# Patient Record
Sex: FEMALE | Race: NATIVE HAWAIIAN OR OTHER PACIFIC ISLANDER
[De-identification: names, ages, dates, MRNs, and addresses within clinical notes are randomized per-mention and may not be internally consistent; named-entity substitution may affect disease eponyms.]

---

## 2018-12-03 ENCOUNTER — HOSPITAL ENCOUNTER (EMERGENCY)
Dept: HOSPITAL 14 - H.ER | Age: 43
LOS: 1 days | Discharge: HOME | End: 2018-12-04
Payer: COMMERCIAL

## 2018-12-03 DIAGNOSIS — F20.9: ICD-10-CM

## 2018-12-03 DIAGNOSIS — F39: Primary | ICD-10-CM

## 2018-12-03 DIAGNOSIS — F31.9: ICD-10-CM

## 2018-12-03 NOTE — ED PDOC
HPI: Psych/Substance Abuse


Time Seen by Provider: 18 20:54


Chief Complaint (Nursing): Psychiatric Evaluation


Chief Complaint (Provider): crisis eval


History Per: Patient, EMS


Additional Complaint(s): 





44 y/o female brought in by EMS for crisis evaluation. Patient states she called

911 because there was a worker in her building banging on her door trying to get

in.  As per EMS, patient called 911 acting paranoid and could not provide EMS or

police with clear or coherent thoughts; mobile crisis was contacted but unable 

to get to home.  Patient calm at present, states she is feeling better; denies 

suicidal/homicidal ideations, acute physical complaints.   





Past Medical History


Reviewed: Historical Data, Nursing Documentation, Vital Signs


Vital Signs: 





                                Last Vital Signs











Temp  98.4 F   18 20:15


 


Pulse  109 H  18 20:15


 


Resp  18   18 20:15


 


BP  156/104 H  18 20:15


 


Pulse Ox  100   18 20:15














- Medical History


PMH: Bipolar Disorder, Schizophrenia





- Surgical History


Surgical History: 





- Family History


Family History: States: No Known Family Hx





- Allergies


Allergies/Adverse Reactions: 


                                    Allergies











Allergy/AdvReac Type Severity Reaction Status Date / Time


 


No Known Allergies Allergy   Verified 18 21:29














Review of Systems


ROS Statement: Except As Marked, All Systems Reviewed And Found Negative


Psych: Positive for: Other (paranoia)





Physical Exam





- Reviewed


Nursing Documentation Reviewed: Yes


Vital Signs Reviewed: Yes





- Physical Exam


Appears: Positive for: Well, Non-toxic, No Acute Distress


Head Exam: Positive for: ATRAUMATIC, NORMAL INSPECTION, NORMOCEPHALIC


Skin: Positive for: Normal Color


Eye Exam: Positive for: Normal appearance


ENT: Positive for: Normal ENT Inspection


Cardiovascular/Chest: Positive for: Regular Rate, Rhythm


Respiratory: Positive for: Normal Breath Sounds


Gastrointestinal/Abdominal: Positive for: Normal Exam


Back: Positive for: Normal Inspection


Extremity: Positive for: Normal ROM


Neurologic/Psych: Positive for: Alert, Oriented (x3), Mood/Affect (guarded)





- ECG


O2 Sat by Pulse Oximetry: 100





- Progress


ED Course And Treament: 





Patient evaluated by crisis worker; does not meet criteria for admission at this

time as per Dr. Sauer


Patient require no further intervention in the ED and is stable for discharge at

this time


Return precautions given














Disposition





- Clinical Impression


Clinical Impression: 


 Mood disorder








- Patient ED Disposition


Is Patient to be Admitted: No


Counseled Patient/Family Regarding: Diagnosis, Need For Followup





- Disposition


Disposition: Routine/Home


Disposition Time: 00:26


Condition: IMPROVED

## 2018-12-04 VITALS
OXYGEN SATURATION: 99 % | RESPIRATION RATE: 16 BRPM | HEART RATE: 86 BPM | SYSTOLIC BLOOD PRESSURE: 136 MMHG | DIASTOLIC BLOOD PRESSURE: 79 MMHG | TEMPERATURE: 98.1 F

## 2019-04-11 ENCOUNTER — HOSPITAL ENCOUNTER (EMERGENCY)
Dept: HOSPITAL 14 - H.ER | Age: 44
LOS: 1 days | Discharge: HOME | End: 2019-04-12
Payer: COMMERCIAL

## 2019-04-11 DIAGNOSIS — F31.9: ICD-10-CM

## 2019-04-11 DIAGNOSIS — F20.0: Primary | ICD-10-CM

## 2019-04-11 PROCEDURE — 96372 THER/PROPH/DIAG INJ SC/IM: CPT

## 2019-04-11 PROCEDURE — 93005 ELECTROCARDIOGRAM TRACING: CPT

## 2019-04-11 PROCEDURE — 99285 EMERGENCY DEPT VISIT HI MDM: CPT

## 2019-04-11 PROCEDURE — 85025 COMPLETE CBC W/AUTO DIFF WBC: CPT

## 2019-04-11 PROCEDURE — 71045 X-RAY EXAM CHEST 1 VIEW: CPT

## 2019-04-11 PROCEDURE — 81003 URINALYSIS AUTO W/O SCOPE: CPT

## 2019-04-11 PROCEDURE — 80048 BASIC METABOLIC PNL TOTAL CA: CPT

## 2019-04-11 PROCEDURE — 81025 URINE PREGNANCY TEST: CPT

## 2019-04-12 VITALS — OXYGEN SATURATION: 99 %

## 2019-04-12 VITALS — DIASTOLIC BLOOD PRESSURE: 77 MMHG | HEART RATE: 99 BPM | SYSTOLIC BLOOD PRESSURE: 118 MMHG | TEMPERATURE: 98.6 F

## 2019-04-12 VITALS — RESPIRATION RATE: 18 BRPM

## 2019-04-12 LAB
APAP SERPL-MCNC: < 10 UG/ML (ref 10–30)
BACTERIA #/AREA URNS HPF: (no result) /[HPF]
BASOPHILS # BLD AUTO: 0.1 K/UL (ref 0–0.2)
BASOPHILS NFR BLD: 0.6 % (ref 0–2)
BILIRUB UR-MCNC: NEGATIVE MG/DL
BUN SERPL-MCNC: 7 MG/DL (ref 7–17)
CALCIUM SERPL-MCNC: 9.7 MG/DL (ref 8.4–10.2)
COLOR UR: YELLOW
EOSINOPHIL # BLD AUTO: 0 K/UL (ref 0–0.7)
EOSINOPHIL NFR BLD: 0.2 % (ref 0–4)
ERYTHROCYTE [DISTWIDTH] IN BLOOD BY AUTOMATED COUNT: 12.6 % (ref 11.5–14.5)
GFR NON-AFRICAN AMERICAN: > 60
GLUCOSE UR STRIP-MCNC: (no result) MG/DL
HGB BLD-MCNC: 13.6 G/DL (ref 12–16)
LEUKOCYTE ESTERASE UR-ACNC: (no result) LEU/UL
LYMPHOCYTES # BLD AUTO: 2.6 K/UL (ref 1–4.3)
LYMPHOCYTES NFR BLD AUTO: 18.5 % (ref 20–40)
MCH RBC QN AUTO: 32.4 PG (ref 27–31)
MCHC RBC AUTO-ENTMCNC: 34.4 G/DL (ref 33–37)
MCV RBC AUTO: 94.3 FL (ref 81–99)
MONOCYTES # BLD: 0.7 K/UL (ref 0–0.8)
MONOCYTES NFR BLD: 5 % (ref 0–10)
NEUTROPHILS # BLD: 10.8 K/UL (ref 1.8–7)
NEUTROPHILS NFR BLD AUTO: 75.7 % (ref 50–75)
NRBC BLD AUTO-RTO: 0.1 % (ref 0–0)
PH UR STRIP: 6 [PH] (ref 5–8)
PLATELET # BLD: 396 K/UL (ref 130–400)
PMV BLD AUTO: 6.9 FL (ref 7.2–11.7)
PROT UR STRIP-MCNC: NEGATIVE MG/DL
RBC # BLD AUTO: 4.19 MIL/UL (ref 3.8–5.2)
RBC # UR STRIP: NEGATIVE /UL
SALICYLATES SERPL-MCNC: < 1 MG/DL
SP GR UR STRIP: 1.01 (ref 1–1.03)
SQUAMOUS EPITHIAL: 2 /HPF (ref 0–5)
URINE CLARITY: (no result)
UROBILINOGEN UR-MCNC: (no result) MG/DL (ref 0.2–1)
WBC # BLD AUTO: 14.3 K/UL (ref 4.8–10.8)

## 2019-04-12 NOTE — CP.PCM.CON
History of Present Illness





- History of Present Illness


History of Present Illness: 


Psychiatry consult note





CC: Bizarre behavior





HPI: Patient currently refusing to talk to writer, just states that she needs to

contact her .   Below history was obtained from the initial ER evaluation:







45 y/o  female who was brought into ED by EMS and [place] PD due to bizarre

behavior.  As per EMS, pt was sitting in a bar calling her friend and saying 

bizarre things.  Pts friend then called 911 for a welfare check.  As per the 

triage note, Pt seemed paranoid and was not forthcoming with information.  While

in ED, Pt kept calling attorneys and was telling them that "under supreme court 

article she is not to be medicated or admitted to any hospital and that she is 

being held against her will in the hospital.  Pt stated her friend Efren who 

lives in [place] called 911 on her and stated the police wanted her to come to the 

hospital for a voluntary welfare check.  Pt denied having a psych hx and denied 

any psych tx.  Pt denied being on medications.  Pt stated she is living in [place] 

and is not a resident of [place].  CW seen a cable box in pts bag and asked pt about

this and she replied, Im moving.  Pt stated she lives in a 3 bedroom home in 

[place] and reported living alone.  Pt denied s/h ideations, as well as, a/v h

allucinations.  Pt denied ever attempting suicide.  Pt denied current/past 

abuse.  Pt denied having any criminal hx.  Pt denied issues with sleep/appetite.

 Pt stated she has a restraining order on her mom and did have any contact with 

her.  Pt stated she speaks to her dad but stated CW cannot contact him and 

stated she would not give CW his number.  Pts speech was loud and affect was 

flat.  Pt seemed bizarre and was fixated on calling her attorneys and public 

defender.  Pt made 3 calls to her  and  and left 

voicemails during the assessment.  Pt was not being cooperative and had to be 

restrained and medicated.  Pt kept repeating under Supreme Court article she is

not to be medicated or admitted to any hospital.  Pt stated she did not want or

need psych admission.





CW spoke to pts sister in law, Nidhi [phone], who stated pt has an hx 

of paranoid schizophrenia and was dx 8 years ago by [facility].  She stated pt is not 

compliant medications or psych tx.  She stated pts family lives in [place] and 

stated pt and her siblings are adopted.  She stated pts bio-mom was dx with an 

unknown mental illness and currently lives in [facility].  She stated pt has been 

delusional and paranoid.  She stated pt is getting a divorce and has a 6 y/o 

son.  She stated her  is supporting pt and stated pt lives in [place] and

does not know her address.  However, she reported that pts  and child 

has a restraining order on pt and pt violated it recently by going to [employer] 

school.  She stated pt could be possibly be danger to herself and others due to 

not complying mental health tx.  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

be a teacher.  She reported pt being admitted in [place] and [place] in psych unit and 

most recent was a month ago in [place].  She feels pt needs psych admission to be 

stabilized. 





Impression: 45 yo female w h/o schizophrenia, should be screened for involuntary

psychiatric admission.





Past Patient History





- Past Social History


Smoking Status: Unknown If Ever Smoked





- CARDIAC


Hx Hypertension: No





- PULMONARY


Hx Tuberculosis: No





- NEUROLOGICAL


Hx Seizures: No





- HEMATOLOGICAL/ONCOLOGICAL


Hx Human Immunodeficiency Virus (HIV): No





- GENITOURINARY/GYNECOLOGICAL


Hx Sexually Transmitted Disorders: No





- PSYCHIATRIC


Hx Bipolar Disorder: Yes


Hx Schizophrenia: Yes





- SURGICAL HISTORY


Hx Surgeries: No





Meds


Allergies/Adverse Reactions: 


                                    Allergies











Allergy/AdvReac Type Severity Reaction Status Date / Time


 


No Known Allergies Allergy   Verified 12/03/18 21:29














Results





- Vital Signs


Recent Vital Signs: 


                                Last Vital Signs











Temp  98.0 F   04/12/19 05:29


 


Pulse  71   04/12/19 05:29


 


Resp  15   04/12/19 05:29


 


BP  100/61   04/12/19 05:29


 


Pulse Ox  99   04/12/19 11:55














- Labs


Result Diagrams: 


                                 04/12/19 02:33





                                 04/12/19 02:33


Labs: 


                         Laboratory Results - last 24 hr











  04/12/19 04/12/19 04/12/19





  02:33 02:33 02:33


 


WBC    14.3 H


 


RBC    4.19


 


Hgb    13.6


 


Hct    39.5


 


MCV    94.3


 


MCH    32.4 H


 


MCHC    34.4


 


RDW    12.6


 


Plt Count    396


 


MPV    6.9 L


 


Neut % (Auto)    75.7 H


 


Lymph % (Auto)    18.5 L


 


Mono % (Auto)    5.0


 


Eos % (Auto)    0.2


 


Baso % (Auto)    0.6


 


Neut # (Auto)    10.8 H


 


Lymph # (Auto)    2.6


 


Mono # (Auto)    0.7


 


Eos # (Auto)    0.0


 


Baso # (Auto)    0.1


 


Sodium   137 


 


Potassium   3.3 L 


 


Chloride   102 


 


Carbon Dioxide   23 


 


Anion Gap   15 


 


BUN   7 


 


Creatinine   0.5 L 


 


Est GFR ( Amer)   > 60 


 


Est GFR (Non-Af Amer)   > 60 


 


Random Glucose   119 H 


 


Calcium   9.7 


 


Urine Color   


 


Urine Clarity   


 


Urine pH   


 


Ur Specific Gravity   


 


Urine Protein   


 


Urine Glucose (UA)   


 


Urine Ketones   


 


Urine Blood   


 


Urine Nitrate   


 


Urine Bilirubin   


 


Urine Urobilinogen   


 


Ur Leukocyte Esterase   


 


Urine RBC (Auto)   


 


Urine Microscopic WBC   


 


Ur Squamous Epith Cells   


 


Urine Bacteria   


 


Salicylates  < 1.0  


 


Urine Opiates Screen   


 


Urine Methadone Screen   


 


Acetaminophen  < 10.0 L  


 


Ur Barbiturates Screen   


 


Ur Phencyclidine Scrn   


 


Ur Amphetamines Screen   


 


U Benzodiazepines Scrn   


 


U Oth Cocaine Metabols   


 


U Cannabinoids Screen   


 


Alcohol, Quantitative   < 10 














  04/12/19 04/12/19





  09:36 09:36


 


WBC  


 


RBC  


 


Hgb  


 


Hct  


 


MCV  


 


MCH  


 


MCHC  


 


RDW  


 


Plt Count  


 


MPV  


 


Neut % (Auto)  


 


Lymph % (Auto)  


 


Mono % (Auto)  


 


Eos % (Auto)  


 


Baso % (Auto)  


 


Neut # (Auto)  


 


Lymph # (Auto)  


 


Mono # (Auto)  


 


Eos # (Auto)  


 


Baso # (Auto)  


 


Sodium  


 


Potassium  


 


Chloride  


 


Carbon Dioxide  


 


Anion Gap  


 


BUN  


 


Creatinine  


 


Est GFR ( Amer)  


 


Est GFR (Non-Af Amer)  


 


Random Glucose  


 


Calcium  


 


Urine Color   Yellow


 


Urine Clarity   Slighty-cloudy


 


Urine pH   6.0


 


Ur Specific Gravity   1.009


 


Urine Protein   Negative


 


Urine Glucose (UA)   Neg


 


Urine Ketones   20


 


Urine Blood   Negative


 


Urine Nitrate   Negative


 


Urine Bilirubin   Negative


 


Urine Urobilinogen   0.2-1.0


 


Ur Leukocyte Esterase   Neg


 


Urine RBC (Auto)   3


 


Urine Microscopic WBC   2


 


Ur Squamous Epith Cells   2


 


Urine Bacteria   Occ H


 


Salicylates  


 


Urine Opiates Screen  Negative 


 


Urine Methadone Screen  Negative 


 


Acetaminophen  


 


Ur Barbiturates Screen  Negative 


 


Ur Phencyclidine Scrn  Negative 


 


Ur Amphetamines Screen  Negative 


 


U Benzodiazepines Scrn  Negative 


 


U Oth Cocaine Metabols  Negative 


 


U Cannabinoids Screen  Negative 


 


Alcohol, Quantitative

## 2019-04-12 NOTE — CARD
--------------- APPROVED REPORT --------------





Date of service: 04/12/2019



EKG Measurement

Heart [id number]

MO [id number]

[id number]

[id number]

[id number]



<Conclusion>

Normal sinus rhythm

ST elevation, probably due to early repolarization

Borderline ECG

## 2019-04-12 NOTE — ED PDOC
- Laboratory Results


Result Diagrams: 


                                 04/12/19 02:33





                                 04/12/19 02:33





- ECG


O2 Sat by Pulse Oximetry: 99 (RA)





Medical Decision Making


Medical Decision Making: 


Time:


Initial Impression: 


Initial Plan:





 


Signed out to Dr. Abrams. pt present overnight as an EDP. pt is showing 

delusional signs and required sedation overnight. Pending medication clearence, 

crisis/ evaluation, current on 1to 1 observation, and reevaluate. 





1145


Pt medically optimized for evaluation by psychiatry.  Labs, UA, EKG, and CXR 

unremarkable.








1500


Patient signed out to Dr. Mo pending [facility] [place]. Patient is stable, 

comfortable and no medical intervention needed at this time. 


 

--------------------------------------------------------------------------------


-----------------


Scribe Attestation:


Documented by Britni Palacios, acting as a scribe for Latasha Abrams


 


Provider Scribe Attestation:


All medical record entries made by the Scribe were at my direction and 

personally dictated by me. I have reviewed the chart and agree that the record 

accurately reflects my personal performance of the history, physical exam, 

medical decision making, and the department course for this patient. I have also

personally directed, reviewed, and agree with the discharge instructions and 

disposition.











Disposition





- Clinical Impression


Clinical Impression: 


 Paranoid schizophrenia








- POA


Present On Arrival: None





- Disposition


Referrals: 


[facility] [Outside]


Disposition: Transfer of Care


Disposition Time: 15:00


Condition: FAIR


Instructions:  Schizophrenia


Forms:  [employer] (English)


Patient Signed Over To: Ward Mo (pending [facility] placement)

## 2019-04-12 NOTE — RAD
Date of service: 



04/12/2019



HISTORY:

 psych 



COMPARISON:

None available.



TECHNIQUE:

1 view obtained.



FINDINGS:



LUNGS:

No active pulmonary disease.



PLEURA:

No significant pleural effusion identified, no pneumothorax apparent.



CARDIOVASCULAR:

No aortic atherosclerotic calcification present.



 Normal cardiac size. No pulmonary vascular congestion. 



OSSEOUS STRUCTURES:

No significant abnormalities.



VISUALIZED UPPER ABDOMEN:

Normal.



OTHER FINDINGS:

None.



IMPRESSION:

No acute cardiopulmonary disease appreciated.

## 2019-04-12 NOTE — ED PDOC
- Laboratory Results


Result Diagrams: 


                                 04/12/19 02:33





                                 04/12/19 02:33


Lab Results: 





                                        











Urine Color  Yellow  (YELLOW)   04/12/19  09:36    


 


Urine Clarity  Slighty-cloudy  (Clear)   04/12/19  09:36    


 


Urine pH  6.0  (5.0-8.0)   04/12/19  09:36    


 


Ur Specific Gravity  1.009  (1.003-1.030)   04/12/19  09:36    


 


Urine Protein  Negative mg/dL (NEGATIVE)   04/12/19  09:36    


 


Urine Glucose (UA)  Neg mg/dL (NEGATIVE)   04/12/19  09:36    


 


Urine Ketones  20 mg/dL (NEGATIVE)   04/12/19  09:36    


 


Urine Blood  Negative  (NEGATIVE)   04/12/19  09:36    


 


Urine Nitrate  Negative  (NEGATIVE)   04/12/19  09:36    


 


Urine Bilirubin  Negative  (NEGATIVE)   04/12/19  09:36    


 


Urine Urobilinogen  0.2-1.0 mg/dL (0.2-1.0)   04/12/19  09:36    


 


Ur Leukocyte Esterase  Neg Ryann/uL (Negative)   04/12/19  09:36    


 


Urine RBC (Auto)  3 /hpf (0-3)   04/12/19  09:36    


 


Urine Microscopic WBC  2 /hpf (0-5)   04/12/19  09:36    


 


Ur Squamous Epith Cells  2 /hpf (0-5)   04/12/19  09:36    


 


Urine Bacteria  Occ  (<OCC)  H  04/12/19  09:36    














- ECG


O2 Sat by Pulse Oximetry: 99





- Progress


Re-evaluation Time: 19:31


Condition: Re-examined (Evaluated by [facility] and found not to be appropriate for 

involuntary admission. Denies SI/HI)





Disposition





- Clinical Impression


Clinical Impression: 


 Paranoid schizophrenia








- POA


Present On Arrival: None





- Disposition


[facility] 


[facility]


Disposition: Routine/Home


Disposition Time: 19:32


Condition: FAIR


Instructions:  Schizophrenia


Forms:  [facility] (English)

## 2019-04-12 NOTE — ED PDOC
HPI: Psych/Substance Abuse


Time Seen by Provider: 19 01:07


Chief Complaint (Nursing): Psychiatric Evaluation


Chief Complaint (Provider): Psychiatric Evaluation


History Per: Patient, Family (Cousin)


History/Exam Limitations: no limitations


Associated Symptoms: Anxiety.  denies: Suicidal Thoughts


Additional Complaint(s): 





44 years old female with history of psychiatric disease brought in by police for

wellness check after her friends called 911. Patient states she was gathering 

her belongings to move from [place] to [place] when this incident happened. Patient's 

cousin reports patient has been off her medications and decompensating and has 

been having more erratic behavior. Cousin is concerned because patient has been 

admitted multiple times for this in the past. Patient continuously asking for 

her civil rights and attempted to call multiple  while in the ED. She 

denies suicidal ideation, drug or alcohol usage. 





PMD: Miguel Hebert





Past Medical History


Reviewed: Historical Data, Nursing Documentation, Vital Signs


Vital Signs: 





                                Last Vital Signs











Temp  98.2 F   19 00:07


 


Pulse  118 H  19 00:07


 


Resp  18   19 00:07


 


BP  115/70   19 00:07


 


Pulse Ox  99   19 00:07














- Medical History


PMH: Bipolar Disorder, Schizophrenia


   Denies: Diabetes, Hepatitis, HIV, HTN, Seizures, Sexually Transmitted Disease





- Surgical History


Surgical History: 





- Family History


Family History: States: Unknown Family Hx





- Allergies


Allergies/Adverse Reactions: 


                                    Allergies











Allergy/AdvReac Type Severity Reaction Status Date / Time


 


No Known Allergies Allergy   Verified 18 21:29














Review of Systems


ROS Statement: Except As Marked, All Systems Reviewed And Found Negative


Psych: Negative for: Suicidal ideation





Physical Exam





- Reviewed


Nursing Documentation Reviewed: Yes


Vital Signs Reviewed: Yes





- Physical Exam


Appears: Positive for: No Acute Distress


Head Exam: Positive for: ATRAUMATIC, NORMOCEPHALIC


Skin: Positive for: Normal Color, Warm, Dry


Eye Exam: Positive for: Normal appearance, EOMI, PERRL


ENT: Positive for: Normal ENT Inspection


Neck: Positive for: Normal, Painless ROM, Supple


Cardiovascular/Chest: Positive for: Regular Rate, Rhythm.  Negative for: Murmur


Respiratory: Positive for: Normal Breath Sounds.  Negative for: Respiratory 

Distress


Gastrointestinal/Abdominal: Positive for: Normal Exam, Soft.  Negative for: 

Tenderness


Back: Positive for: Normal Inspection.  Negative for: L CVA Tenderness, R CVA 

Tenderness


Extremity: Positive for: Normal ROM.  Negative for: Pedal Edema, Deformity


Neurological/Psych: Positive for: Awake, Alert, Oriented (x3), Other (Patient is

extremely anxious. Patient has paranoid fixed delusions.)





- Laboratory Results


Result Diagrams: 


                                 19 02:33





                                 19 02:33





- ECG


O2 Sat by Pulse Oximetry: 99 (RA)


Pulse Ox Interpretation: Normal





Medical Decision Making


Medical Decision Making: 





Time: 0126


A/P: 45 yo female with paranoid schizophrenia


--Patient is not cooperating with staff regarding workup


--Patient is not exhibiting clear thinking or rational thinking


--Patient was relieved of agitation with medication and required restraints for 

own safety


--Crisis worker, Jeniffer, evaluated patient and will await for psychiatric re

commendations.


 

--------------------------------------------------------------------------------


-----------------


Scribe Attestation:


Documented by Kristen Bustamante, acting as a scribe for Rodrigo Guerrero MD.


 


Provider Scribe Attestation:


All medical record entries made by the Scribe were at my direction and 

personally dictated by me. I have reviewed the chart and agree that the record 

accurately reflects my personal performance of the history, physical exam, 

medical decision making, and the department course for this patient. I have also

personally directed, reviewed, and agree with the discharge instructions and 

disposition.





Disposition





- Clinical Impression


Clinical Impression: 


 Mood disorder








- Patient ED Disposition


Is Patient to be Admitted: Transfer of Care





- Disposition


Disposition: Transfer of Care


Disposition Time: 06:53


Condition: STABLE


Forms:  [facility] (English)


Patient Signed Over To: Latasha Abrams


Handoff Comments: pending medical clearance and [facility] [place]